# Patient Record
Sex: MALE | Race: WHITE | NOT HISPANIC OR LATINO | Employment: UNEMPLOYED | URBAN - METROPOLITAN AREA
[De-identification: names, ages, dates, MRNs, and addresses within clinical notes are randomized per-mention and may not be internally consistent; named-entity substitution may affect disease eponyms.]

---

## 2023-01-20 ENCOUNTER — HOSPITAL ENCOUNTER (INPATIENT)
Facility: HOSPITAL | Age: 38
LOS: 5 days | Discharge: HOME | DRG: 885 | End: 2023-01-25
Attending: PSYCHIATRY & NEUROLOGY | Admitting: PSYCHIATRY & NEUROLOGY
Payer: COMMERCIAL

## 2023-01-20 ENCOUNTER — HOSPITAL ENCOUNTER (EMERGENCY)
Facility: HOSPITAL | Age: 38
Discharge: PSYCHIATRIC OR SUBSTANCE USE FACILITY | DRG: 885 | End: 2023-01-20
Admitting: EMERGENCY MEDICINE
Payer: COMMERCIAL

## 2023-01-20 VITALS
BODY MASS INDEX: 30.04 KG/M2 | DIASTOLIC BLOOD PRESSURE: 83 MMHG | SYSTOLIC BLOOD PRESSURE: 149 MMHG | RESPIRATION RATE: 16 BRPM | HEART RATE: 65 BPM | OXYGEN SATURATION: 100 % | HEIGHT: 73 IN | TEMPERATURE: 98.2 F | WEIGHT: 226.63 LBS

## 2023-01-20 DIAGNOSIS — R44.2 TACTILE HALLUCINATIONS: ICD-10-CM

## 2023-01-20 DIAGNOSIS — F29 PSYCHOSIS, UNSPECIFIED PSYCHOSIS TYPE: Primary | ICD-10-CM

## 2023-01-20 DIAGNOSIS — R44.0 AUDITORY HALLUCINATIONS: ICD-10-CM

## 2023-01-20 DIAGNOSIS — R44.1 VISUAL HALLUCINATIONS: ICD-10-CM

## 2023-01-20 LAB
ALBUMIN SERPL-MCNC: 4.6 G/DL (ref 3.5–5.2)
ALBUMIN/GLOB SERPL: 1.6 G/DL
ALP SERPL-CCNC: 79 U/L (ref 39–117)
ALT SERPL W P-5'-P-CCNC: 23 U/L (ref 1–41)
AMPHET+METHAMPHET UR QL: NEGATIVE
ANION GAP SERPL CALCULATED.3IONS-SCNC: 7 MMOL/L (ref 5–15)
APAP SERPL-MCNC: <5 MCG/ML (ref 0–30)
AST SERPL-CCNC: 17 U/L (ref 1–40)
BARBITURATES UR QL SCN: NEGATIVE
BASOPHILS # BLD AUTO: 0.06 10*3/MM3 (ref 0–0.2)
BASOPHILS NFR BLD AUTO: 0.9 % (ref 0–1.5)
BENZODIAZ UR QL SCN: NEGATIVE
BILIRUB SERPL-MCNC: 0.2 MG/DL (ref 0–1.2)
BUN SERPL-MCNC: 10 MG/DL (ref 6–20)
BUN/CREAT SERPL: 13 (ref 7–25)
CALCIUM SPEC-SCNC: 9.9 MG/DL (ref 8.6–10.5)
CANNABINOIDS SERPL QL: NEGATIVE
CHLORIDE SERPL-SCNC: 101 MMOL/L (ref 98–107)
CO2 SERPL-SCNC: 31 MMOL/L (ref 22–29)
COCAINE UR QL: NEGATIVE
CREAT SERPL-MCNC: 0.77 MG/DL (ref 0.76–1.27)
DEPRECATED RDW RBC AUTO: 42 FL (ref 37–54)
EGFRCR SERPLBLD CKD-EPI 2021: 119 ML/MIN/1.73
EOSINOPHIL # BLD AUTO: 0.17 10*3/MM3 (ref 0–0.4)
EOSINOPHIL NFR BLD AUTO: 2.5 % (ref 0.3–6.2)
ERYTHROCYTE [DISTWIDTH] IN BLOOD BY AUTOMATED COUNT: 12.8 % (ref 12.3–15.4)
ETHANOL BLD-MCNC: <10 MG/DL (ref 0–10)
ETHANOL UR QL: <0.01 %
GLOBULIN UR ELPH-MCNC: 2.9 GM/DL
GLUCOSE SERPL-MCNC: 124 MG/DL (ref 65–99)
HCT VFR BLD AUTO: 43.8 % (ref 37.5–51)
HGB BLD-MCNC: 14.6 G/DL (ref 13–17.7)
HOLD SPECIMEN: NORMAL
HOLD SPECIMEN: NORMAL
IMM GRANULOCYTES # BLD AUTO: 0.01 10*3/MM3 (ref 0–0.05)
IMM GRANULOCYTES NFR BLD AUTO: 0.1 % (ref 0–0.5)
LYMPHOCYTES # BLD AUTO: 3.27 10*3/MM3 (ref 0.7–3.1)
LYMPHOCYTES NFR BLD AUTO: 48.7 % (ref 19.6–45.3)
MCH RBC QN AUTO: 29.9 PG (ref 26.6–33)
MCHC RBC AUTO-ENTMCNC: 33.3 G/DL (ref 31.5–35.7)
MCV RBC AUTO: 89.6 FL (ref 79–97)
METHADONE UR QL SCN: NEGATIVE
MONOCYTES # BLD AUTO: 0.49 10*3/MM3 (ref 0.1–0.9)
MONOCYTES NFR BLD AUTO: 7.3 % (ref 5–12)
NEUTROPHILS NFR BLD AUTO: 2.71 10*3/MM3 (ref 1.7–7)
NEUTROPHILS NFR BLD AUTO: 40.5 % (ref 42.7–76)
NRBC BLD AUTO-RTO: 0 /100 WBC (ref 0–0.2)
OPIATES UR QL: NEGATIVE
OXYCODONE UR QL SCN: NEGATIVE
PLATELET # BLD AUTO: 236 10*3/MM3 (ref 140–450)
PMV BLD AUTO: 8.9 FL (ref 6–12)
POTASSIUM SERPL-SCNC: 4 MMOL/L (ref 3.5–5.2)
PROT SERPL-MCNC: 7.5 G/DL (ref 6–8.5)
RBC # BLD AUTO: 4.89 10*6/MM3 (ref 4.14–5.8)
SALICYLATES SERPL-MCNC: <0.3 MG/DL
SODIUM SERPL-SCNC: 139 MMOL/L (ref 136–145)
WBC NRBC COR # BLD: 6.71 10*3/MM3 (ref 3.4–10.8)
WHOLE BLOOD HOLD COAG: NORMAL
WHOLE BLOOD HOLD SPECIMEN: NORMAL

## 2023-01-20 PROCEDURE — 80307 DRUG TEST PRSMV CHEM ANLYZR: CPT

## 2023-01-20 PROCEDURE — 99284 EMERGENCY DEPT VISIT MOD MDM: CPT

## 2023-01-20 PROCEDURE — 80143 DRUG ASSAY ACETAMINOPHEN: CPT

## 2023-01-20 PROCEDURE — 99283 EMERGENCY DEPT VISIT LOW MDM: CPT

## 2023-01-20 PROCEDURE — 85025 COMPLETE CBC W/AUTO DIFF WBC: CPT

## 2023-01-20 PROCEDURE — 80053 COMPREHEN METABOLIC PANEL: CPT

## 2023-01-20 PROCEDURE — 80179 DRUG ASSAY SALICYLATE: CPT

## 2023-01-20 PROCEDURE — 82077 ASSAY SPEC XCP UR&BREATH IA: CPT

## 2023-01-20 PROCEDURE — 36415 COLL VENOUS BLD VENIPUNCTURE: CPT

## 2023-01-20 RX ORDER — HALOPERIDOL 5 MG/ML
5 INJECTION INTRAMUSCULAR EVERY 4 HOURS PRN
Status: DISCONTINUED | OUTPATIENT
Start: 2023-01-20 | End: 2023-01-25 | Stop reason: HOSPADM

## 2023-01-20 RX ORDER — ALUMINA, MAGNESIA, AND SIMETHICONE 2400; 2400; 240 MG/30ML; MG/30ML; MG/30ML
15 SUSPENSION ORAL EVERY 6 HOURS PRN
Status: DISCONTINUED | OUTPATIENT
Start: 2023-01-20 | End: 2023-01-25 | Stop reason: HOSPADM

## 2023-01-20 RX ORDER — IBUPROFEN 400 MG/1
400 TABLET ORAL EVERY 6 HOURS PRN
Status: DISCONTINUED | OUTPATIENT
Start: 2023-01-20 | End: 2023-01-25 | Stop reason: HOSPADM

## 2023-01-20 RX ORDER — SODIUM CHLORIDE 0.9 % (FLUSH) 0.9 %
10 SYRINGE (ML) INJECTION AS NEEDED
Status: DISCONTINUED | OUTPATIENT
Start: 2023-01-20 | End: 2023-01-20 | Stop reason: HOSPADM

## 2023-01-20 RX ORDER — TRAZODONE HYDROCHLORIDE 50 MG/1
50 TABLET ORAL NIGHTLY PRN
Status: DISCONTINUED | OUTPATIENT
Start: 2023-01-20 | End: 2023-01-25 | Stop reason: HOSPADM

## 2023-01-20 RX ORDER — LOPERAMIDE HYDROCHLORIDE 2 MG/1
2 CAPSULE ORAL
Status: DISCONTINUED | OUTPATIENT
Start: 2023-01-20 | End: 2023-01-25 | Stop reason: HOSPADM

## 2023-01-20 RX ORDER — DIPHENHYDRAMINE HYDROCHLORIDE 50 MG/ML
50 INJECTION INTRAMUSCULAR; INTRAVENOUS EVERY 4 HOURS PRN
Status: DISCONTINUED | OUTPATIENT
Start: 2023-01-20 | End: 2023-01-25 | Stop reason: HOSPADM

## 2023-01-20 RX ORDER — BUPRENORPHINE HYDROCHLORIDE AND NALOXONE HYDROCHLORIDE DIHYDRATE 8; 2 MG/1; MG/1
1.5 TABLET SUBLINGUAL DAILY
Status: ON HOLD | COMMUNITY
End: 2023-01-25

## 2023-01-20 RX ORDER — FAMOTIDINE 20 MG/1
20 TABLET, FILM COATED ORAL 2 TIMES DAILY PRN
Status: DISCONTINUED | OUTPATIENT
Start: 2023-01-20 | End: 2023-01-25 | Stop reason: HOSPADM

## 2023-01-20 RX ORDER — HYDROXYZINE HYDROCHLORIDE 25 MG/1
50 TABLET, FILM COATED ORAL EVERY 6 HOURS PRN
Status: DISCONTINUED | OUTPATIENT
Start: 2023-01-20 | End: 2023-01-25 | Stop reason: HOSPADM

## 2023-01-20 RX ORDER — ACETAMINOPHEN 325 MG/1
650 TABLET ORAL EVERY 6 HOURS PRN
Status: DISCONTINUED | OUTPATIENT
Start: 2023-01-20 | End: 2023-01-25 | Stop reason: HOSPADM

## 2023-01-20 RX ORDER — DIPHENHYDRAMINE HCL 50 MG
50 CAPSULE ORAL EVERY 4 HOURS PRN
Status: DISCONTINUED | OUTPATIENT
Start: 2023-01-20 | End: 2023-01-25 | Stop reason: HOSPADM

## 2023-01-20 RX ORDER — HALOPERIDOL 5 MG/1
5 TABLET ORAL EVERY 4 HOURS PRN
Status: DISCONTINUED | OUTPATIENT
Start: 2023-01-20 | End: 2023-01-25 | Stop reason: HOSPADM

## 2023-01-21 PROCEDURE — 63710000001 DIPHENHYDRAMINE PER 50 MG: Performed by: PSYCHIATRY & NEUROLOGY

## 2023-01-21 RX ORDER — BUPRENORPHINE HYDROCHLORIDE AND NALOXONE HYDROCHLORIDE DIHYDRATE 8; 2 MG/1; MG/1
1.5 TABLET SUBLINGUAL DAILY
Status: DISCONTINUED | OUTPATIENT
Start: 2023-01-21 | End: 2023-01-25 | Stop reason: HOSPADM

## 2023-01-21 RX ORDER — NICOTINE 21 MG/24HR
1 PATCH, TRANSDERMAL 24 HOURS TRANSDERMAL
Status: DISCONTINUED | OUTPATIENT
Start: 2023-01-21 | End: 2023-01-25 | Stop reason: HOSPADM

## 2023-01-21 RX ORDER — RISPERIDONE 1 MG/1
1 TABLET ORAL EVERY 12 HOURS SCHEDULED
Status: DISCONTINUED | OUTPATIENT
Start: 2023-01-21 | End: 2023-01-22

## 2023-01-21 RX ADMIN — HYDROXYZINE HYDROCHLORIDE 50 MG: 25 TABLET, FILM COATED ORAL at 03:55

## 2023-01-21 RX ADMIN — BUPRENORPHINE HYDROCHLORIDE AND NALOXONE HYDROCHLORIDE DIHYDRATE 1.5 TABLET: 8; 2 TABLET SUBLINGUAL at 08:54

## 2023-01-21 RX ADMIN — RISPERIDONE 1 MG: 1 TABLET ORAL at 08:54

## 2023-01-21 RX ADMIN — HALOPERIDOL 5 MG: 5 TABLET ORAL at 23:06

## 2023-01-21 RX ADMIN — NICOTINE 1 PATCH: 21 PATCH, EXTENDED RELEASE TRANSDERMAL at 12:03

## 2023-01-21 RX ADMIN — RISPERIDONE 1 MG: 1 TABLET ORAL at 20:04

## 2023-01-21 RX ADMIN — DIPHENHYDRAMINE HYDROCHLORIDE 50 MG: 50 CAPSULE ORAL at 23:06

## 2023-01-21 RX ADMIN — IBUPROFEN 400 MG: 400 TABLET, FILM COATED ORAL at 03:55

## 2023-01-21 NOTE — ED PROVIDER NOTES
Time: 8:22 PM EST  Date of encounter:  1/20/2023  Independent Historian/Clinical History and Information was obtained by:   Patient and Family  Chief Complaint   Patient presents with   • Hallucinations     Patient reports visual, sensory, and auditory hallucinations X2 years. 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 Last meth use 12 days ago.       History is limited by: N/A    History of Present Illness:  Patient is a 37 y.o. year old male who presents to the emergency department for evaluation of hearing, seeing, and feeling demons and angels that are threatening to kill him.  Previous psych admission.  Denies SI/HI.  Was on meds, but stopped them because it only lessoned his symptoms but did not relieve them completely.  He reports that the demons and angels make him read the Bible.      HPI    Patient Care Team  Primary Care Provider: Provider, No Known    Past Medical History:     No Known Allergies  Past Medical History:   Diagnosis Date   • Depression      Past Surgical History:   Procedure Laterality Date   • CHOLECYSTECTOMY     • HERNIA REPAIR       History reviewed. No pertinent family history.    Home Medications:  Prior to Admission medications    Not on File        Social History:   Social History     Tobacco Use   • Smoking status: Every Day     Types: Cigarettes   Substance Use Topics   • Alcohol use: Never   • Drug use: Yes     Types: Methamphetamines, Marijuana     Comment: states he quit 12 weeks ago but had meth 12 days ago         Review of Systems:  Review of Systems   Psychiatric/Behavioral: Positive for agitation, decreased concentration, dysphoric mood, hallucinations and sleep disturbance. Negative for self-injury and suicidal ideas. The patient is nervous/anxious.    All other systems reviewed and are negative.       Physical Exam:  /83 (BP Location: Right arm, Patient Position: Lying)   Pulse 65   Temp 98.2 °F (36.8 °C)  "(Oral)   Resp 16   Ht 185.4 cm (73\")   Wt 103 kg (226 lb 10.1 oz)   SpO2 100%   BMI 29.90 kg/m²     Physical Exam  Vitals and nursing note reviewed.   Constitutional:       General: He is not in acute distress.     Appearance: Normal appearance. He is not ill-appearing, toxic-appearing or diaphoretic.   HENT:      Head: Normocephalic and atraumatic.   Eyes:      Extraocular Movements: Extraocular movements intact.      Conjunctiva/sclera: Conjunctivae normal.      Pupils: Pupils are equal, round, and reactive to light.   Cardiovascular:      Rate and Rhythm: Normal rate and regular rhythm.      Pulses: Normal pulses.      Heart sounds: Normal heart sounds.   Pulmonary:      Effort: Pulmonary effort is normal.      Breath sounds: Normal breath sounds.   Abdominal:      General: Abdomen is flat. Bowel sounds are normal. There is no distension.      Palpations: Abdomen is soft.   Musculoskeletal:         General: Normal range of motion.      Cervical back: Normal range of motion and neck supple.   Skin:     General: Skin is warm and dry.      Capillary Refill: Capillary refill takes less than 2 seconds.      Coloration: Skin is not cyanotic.   Neurological:      General: No focal deficit present.      Mental Status: He is alert and oriented to person, place, and time.      Cranial Nerves: No cranial nerve deficit.      Sensory: No sensory deficit.      Motor: No weakness.      Coordination: Coordination normal.      Gait: Gait normal.   Psychiatric:         Attention and Perception: Attention and perception normal.         Mood and Affect: Mood normal.                  Procedures:  Procedures      Medical Decision Making:      Comorbidities that affect care:    Smoking    External Notes reviewed:    Previous ED Note      The following orders were placed and all results were independently analyzed by me:  Orders Placed This Encounter   Procedures   • [place] Draw   • Comprehensive Metabolic Panel   • Acetaminophen " Level   • Ethanol   • Salicylate Level   • Urine Drug Screen - Urine, Clean Catch   • CBC Auto Differential   • NPO Diet NPO Type: Strict NPO   • Vital Signs   • Undress & Gown   • Psych / Access to See   • Inpatient Psychiatrist Consult   • Insert Peripheral IV   • Suicide Precautions   • CBC & Differential   • Green Top (Gel)   • Lavender Top   • Gold Top - SST   • Light Blue Top       Medications Given in the Emergency Department:  Medications   sodium chloride 0.9 % flush 10 mL (has no administration in time range)        ED Course:    The patient was initially evaluated in the triage area where orders were placed. The patient was later dispositioned by NAVIN Helton.      The patient was advised to stay for completion of workup which includes but is not limited to communication of labs and radiological results, reassessment and plan. The patient was advised that leaving prior to disposition by a provider could result in critical findings that are not communicated to the patient.          Labs:    Lab Results (last 24 hours)     Procedure Component Value Units Date/Time    CBC & Differential [[id number]]  (Abnormal) Collected: 01/20/23 1959    Specimen: Blood Updated: 01/20/23 2011    Narrative:      The following orders were created for panel order CBC & Differential.  Procedure                               Abnormality         Status                     ---------                               -----------         ------                     CBC Auto Differential[[id number]]        Abnormal            Final result                 Please view results for these tests on the individual orders.    Comprehensive Metabolic Panel [[id number]]  (Abnormal) Collected: 01/20/23 1959    Specimen: Blood Updated: 01/20/23 2041     Glucose 124 mg/dL      BUN 10 mg/dL      Creatinine 0.77 mg/dL      Sodium 139 mmol/L      Potassium 4.0 mmol/L      Chloride 101 mmol/L      CO2 31.0 mmol/L      Calcium 9.9 mg/dL      Total  Protein 7.5 g/dL      Albumin 4.6 g/dL      ALT (SGPT) 23 U/L      AST (SGOT) 17 U/L      Alkaline Phosphatase 79 U/L      Total Bilirubin 0.2 mg/dL      Globulin 2.9 gm/dL      A/G Ratio 1.6 g/dL      BUN/Creatinine Ratio 13.0     Anion Gap 7.0 mmol/L      eGFR 119.0 mL/min/1.73     Narrative:      GFR Normal >60  Chronic Kidney Disease <60  Kidney Failure <15      Acetaminophen Level [[id number]]  (Normal) Collected: 01/20/23 1959    Specimen: Blood Updated: 01/20/23 2041     Acetaminophen <5.0 mcg/mL     Ethanol [[id number]] Collected: 01/20/23 1959    Specimen: Blood Updated: 01/20/23 2041     Ethanol <10 mg/dL      Ethanol % <0.010 %     Narrative:      Ethanol (Plasma)  <10 Essentially Negative    Toxic Concentrations           mg/dL    Flushing, slowing of reflexes    Impaired visual activity         Depression of CNS              >100  Possible Coma                  >300       Salicylate Level [[id number]]  (Normal) Collected: 01/20/23 1959    Specimen: Blood Updated: 01/20/23 2041     Salicylate <0.3 mg/dL     CBC Auto Differential [[id number]]  (Abnormal) Collected: 01/20/23 1959    Specimen: Blood Updated: 01/20/23 2011     WBC 6.71 10*3/mm3      RBC 4.89 10*6/mm3      Hemoglobin 14.6 g/dL      Hematocrit 43.8 %      MCV 89.6 fL      MCH 29.9 pg      MCHC 33.3 g/dL      RDW 12.8 %      RDW-SD 42.0 fl      MPV 8.9 fL      Platelets 236 10*3/mm3      Neutrophil % 40.5 %      Lymphocyte % 48.7 %      Monocyte % 7.3 %      Eosinophil % 2.5 %      Basophil % 0.9 %      Immature Grans % 0.1 %      Neutrophils, Absolute 2.71 10*3/mm3      Lymphocytes, Absolute 3.27 10*3/mm3      Monocytes, Absolute 0.49 10*3/mm3      Eosinophils, Absolute 0.17 10*3/mm3      Basophils, Absolute 0.06 10*3/mm3      Immature Grans, Absolute 0.01 10*3/mm3      nRBC 0.0 /100 WBC     Urine Drug Screen - Urine, Clean Catch [[id number]]  (Normal) Collected: 01/20/23 2001    Specimen: Urine, Clean Catch Updated: 01/20/23 2025      Amphet/Methamphet, Screen Negative     Barbiturates Screen, Urine Negative     Benzodiazepine Screen, Urine Negative     Cocaine Screen, Urine Negative     Opiate Screen Negative     THC, Screen, Urine Negative     Methadone Screen, Urine Negative     Oxycodone Screen, Urine Negative    Narrative:      Negative Thresholds Per Drugs Screened:    Amphetamines                 500 ng/ml  Barbiturates                 200 ng/ml  Benzodiazepines              100 ng/ml  Cocaine                      300 ng/ml  Methadone                    300 ng/ml  Opiates                      300 ng/ml  Oxycodone                    100 ng/ml  THC                           50 ng/ml    The Normal Value for all drugs tested is negative. This report includes final unconfirmed screening results to be used for medical treatment purposes only. Unconfirmed results must not be used for non-medical purposes such as employment or legal testing. Clinical consideration should be applied to any drug of abuse test, particularly when unconfirmed results are used.                   Imaging:    No Radiology Exams Resulted Within Past 24 Hours      Differential Diagnosis and Discussion:      Psychiatric: Differential diagnosis includes but is not limited to depression, psychosis, bipolar disorder, anxiety, manic episode, schizophrenia, and substance abuse.    All labs were reviewed and analyzed by me.    MDM  Number of Diagnoses or Management Options  Auditory hallucinations  Psychosis, unspecified psychosis type (HCC)  Tactile hallucinations  Visual hallucinations  Diagnosis management comments: Seen and assessed patient as noted.  Vitals stable, no acute distress, afebrile.       Patient has psychosis with auditory, visual, and tactile hallucinations and is willing to be admitted for help.  Calling psychiatrist now for admission for psychosis and hallucinations.    Dr Alexis kindly accepted the admission.       Amount and/or Complexity of Data  Reviewed  Clinical lab tests: ordered and reviewed  Discuss the patient with other providers: yes    Risk of Complications, Morbidity, and/or Mortality  Presenting problems: high  Diagnostic procedures: low  Management options: moderate    Patient Progress  Patient progress: stable           Patient Care Considerations:          Consultants/Shared Management Plan:    Consultant: I have discussed the case with Dr Alexis who agrees to consult on the patient.    Social Determinants of Health:    Patient is independent, reliable, and has access to care.       Disposition and Care Coordination:    Admit:   Through independent evaluation of the patient's history, physical, and imperical data, the patient meets criteria for observation/admission to the hospital.        Final diagnoses:   Psychosis, unspecified psychosis type (HCC)   Auditory hallucinations   Visual hallucinations   Tactile hallucinations        ED Disposition     ED Disposition   DC/Transfer to Behavioral Health Condition   Stable    Comment   --             This medical record created using voice recognition software.           Ema Liriano, NAVIN  01/20/23 2032       Ema Liriano, APRBRADEN  01/20/23 2034       Ema Liriano, APRN  01/20/23 2118

## 2023-01-22 RX ORDER — ARIPIPRAZOLE 10 MG/1
10 TABLET ORAL DAILY
Status: DISCONTINUED | OUTPATIENT
Start: 2023-01-22 | End: 2023-01-25 | Stop reason: DRUGHIGH

## 2023-01-22 RX ADMIN — ARIPIPRAZOLE 10 MG: 10 TABLET ORAL at 11:13

## 2023-01-22 RX ADMIN — NICOTINE 1 PATCH: 21 PATCH, EXTENDED RELEASE TRANSDERMAL at 11:13

## 2023-01-22 RX ADMIN — TRAZODONE HYDROCHLORIDE 50 MG: 50 TABLET ORAL at 20:31

## 2023-01-22 RX ADMIN — BUPRENORPHINE HYDROCHLORIDE AND NALOXONE HYDROCHLORIDE DIHYDRATE 1.5 TABLET: 8; 2 TABLET SUBLINGUAL at 11:14

## 2023-01-23 RX ORDER — PRAZOSIN HYDROCHLORIDE 1 MG/1
1 CAPSULE ORAL NIGHTLY
Status: DISCONTINUED | OUTPATIENT
Start: 2023-01-23 | End: 2023-01-25 | Stop reason: HOSPADM

## 2023-01-23 RX ADMIN — HYDROXYZINE HYDROCHLORIDE 50 MG: 25 TABLET, FILM COATED ORAL at 21:49

## 2023-01-23 RX ADMIN — NICOTINE 1 PATCH: 21 PATCH, EXTENDED RELEASE TRANSDERMAL at 08:51

## 2023-01-23 RX ADMIN — TRAZODONE HYDROCHLORIDE 50 MG: 50 TABLET ORAL at 21:31

## 2023-01-23 RX ADMIN — BUPRENORPHINE HYDROCHLORIDE AND NALOXONE HYDROCHLORIDE DIHYDRATE 1.5 TABLET: 8; 2 TABLET SUBLINGUAL at 08:51

## 2023-01-23 RX ADMIN — PRAZOSIN HYDROCHLORIDE 1 MG: 1 CAPSULE ORAL at 21:32

## 2023-01-23 RX ADMIN — ALUMINUM HYDROXIDE, MAGNESIUM HYDROXIDE, AND DIMETHICONE 15 ML: 400; 400; 40 SUSPENSION ORAL at 23:26

## 2023-01-23 RX ADMIN — ARIPIPRAZOLE 10 MG: 10 TABLET ORAL at 08:51

## 2023-01-24 RX ORDER — ARIPIPRAZOLE 5 MG/1
5 TABLET ORAL EVERY MORNING
Status: DISCONTINUED | OUTPATIENT
Start: 2023-01-24 | End: 2023-01-25 | Stop reason: DRUGHIGH

## 2023-01-24 RX ADMIN — ARIPIPRAZOLE 10 MG: 10 TABLET ORAL at 08:38

## 2023-01-24 RX ADMIN — ARIPIPRAZOLE 5 MG: 5 TABLET ORAL at 11:50

## 2023-01-24 RX ADMIN — PRAZOSIN HYDROCHLORIDE 1 MG: 1 CAPSULE ORAL at 21:32

## 2023-01-24 RX ADMIN — ALUMINUM HYDROXIDE, MAGNESIUM HYDROXIDE, AND DIMETHICONE 15 ML: 400; 400; 40 SUSPENSION ORAL at 16:35

## 2023-01-24 RX ADMIN — BUPRENORPHINE HYDROCHLORIDE AND NALOXONE HYDROCHLORIDE DIHYDRATE 1.5 TABLET: 8; 2 TABLET SUBLINGUAL at 08:38

## 2023-01-24 RX ADMIN — NICOTINE 1 PATCH: 21 PATCH, EXTENDED RELEASE TRANSDERMAL at 08:38

## 2023-01-25 VITALS
TEMPERATURE: 97.9 F | OXYGEN SATURATION: 99 % | BODY MASS INDEX: 30.09 KG/M2 | HEART RATE: 98 BPM | WEIGHT: 227.07 LBS | DIASTOLIC BLOOD PRESSURE: 92 MMHG | SYSTOLIC BLOOD PRESSURE: 147 MMHG | RESPIRATION RATE: 18 BRPM | HEIGHT: 73 IN

## 2023-01-25 RX ORDER — ARIPIPRAZOLE 15 MG/1
15 TABLET ORAL DAILY
Status: DISCONTINUED | OUTPATIENT
Start: 2023-01-25 | End: 2023-01-25 | Stop reason: HOSPADM

## 2023-01-25 RX ORDER — PRAZOSIN HYDROCHLORIDE 1 MG/1
1 CAPSULE ORAL NIGHTLY
Qty: 30 CAPSULE | Refills: 0 | Status: SHIPPED | OUTPATIENT
Start: 2023-01-25

## 2023-01-25 RX ORDER — ARIPIPRAZOLE 15 MG/1
15 TABLET ORAL DAILY
Qty: 30 TABLET | Refills: 0 | Status: SHIPPED | OUTPATIENT
Start: 2023-01-26

## 2023-01-25 RX ADMIN — BUPRENORPHINE HYDROCHLORIDE AND NALOXONE HYDROCHLORIDE DIHYDRATE 1.5 TABLET: 8; 2 TABLET SUBLINGUAL at 09:04

## 2023-01-25 RX ADMIN — ARIPIPRAZOLE 15 MG: 15 TABLET ORAL at 09:04

## 2023-01-25 RX ADMIN — ACETAMINOPHEN 650 MG: 325 TABLET ORAL at 15:39

## 2023-01-25 RX ADMIN — NICOTINE 1 PATCH: 21 PATCH, EXTENDED RELEASE TRANSDERMAL at 09:03

## 2025-04-10 ENCOUNTER — HOSPITAL ENCOUNTER (INPATIENT)
Facility: HOSPITAL | Age: 40
LOS: 4 days | Discharge: HOME | DRG: 885 | End: 2025-04-14
Attending: PSYCHIATRY & NEUROLOGY | Admitting: PSYCHIATRY & NEUROLOGY
Payer: MEDICAID

## 2025-04-10 PROBLEM — F29 PSYCHOSIS, UNSPECIFIED PSYCHOSIS TYPE: Status: ACTIVE | Noted: 2025-04-10

## 2025-04-10 LAB
T4 FREE SERPL-MCNC: 1.48 NG/DL (ref 0.92–1.68)
TSH SERPL DL<=0.05 MIU/L-ACNC: 2.28 UIU/ML (ref 0.27–4.2)
WHOLE BLOOD HOLD SPECIMEN: NORMAL

## 2025-04-10 PROCEDURE — 84443 ASSAY THYROID STIM HORMONE: CPT | Performed by: PSYCHIATRY & NEUROLOGY

## 2025-04-10 PROCEDURE — 84439 ASSAY OF FREE THYROXINE: CPT | Performed by: PSYCHIATRY & NEUROLOGY

## 2025-04-10 RX ORDER — TRAZODONE HYDROCHLORIDE 100 MG/1
100 TABLET ORAL NIGHTLY PRN
Status: DISCONTINUED | OUTPATIENT
Start: 2025-04-10 | End: 2025-04-14 | Stop reason: HOSPADM

## 2025-04-10 RX ORDER — DIAZEPAM 10 MG/2ML
5 INJECTION, SOLUTION INTRAMUSCULAR; INTRAVENOUS EVERY 4 HOURS PRN
Status: DISCONTINUED | OUTPATIENT
Start: 2025-04-10 | End: 2025-04-14 | Stop reason: HOSPADM

## 2025-04-10 RX ORDER — HALOPERIDOL 5 MG/ML
5 INJECTION INTRAMUSCULAR EVERY 4 HOURS PRN
Status: DISCONTINUED | OUTPATIENT
Start: 2025-04-10 | End: 2025-04-14 | Stop reason: HOSPADM

## 2025-04-10 RX ORDER — ALUMINA, MAGNESIA, AND SIMETHICONE 2400; 2400; 240 MG/30ML; MG/30ML; MG/30ML
15 SUSPENSION ORAL EVERY 6 HOURS PRN
Status: DISCONTINUED | OUTPATIENT
Start: 2025-04-10 | End: 2025-04-14 | Stop reason: HOSPADM

## 2025-04-10 RX ORDER — ACETAMINOPHEN 325 MG/1
650 TABLET ORAL EVERY 6 HOURS PRN
Status: DISCONTINUED | OUTPATIENT
Start: 2025-04-10 | End: 2025-04-14 | Stop reason: HOSPADM

## 2025-04-10 RX ORDER — LISINOPRIL 10 MG/1
10 TABLET ORAL DAILY
Status: ON HOLD | COMMUNITY
End: 2025-04-14

## 2025-04-10 RX ORDER — HALOPERIDOL 5 MG/1
5 TABLET ORAL EVERY 4 HOURS PRN
Status: DISCONTINUED | OUTPATIENT
Start: 2025-04-10 | End: 2025-04-14 | Stop reason: HOSPADM

## 2025-04-10 RX ORDER — LOPERAMIDE HYDROCHLORIDE 2 MG/1
2 CAPSULE ORAL
Status: DISCONTINUED | OUTPATIENT
Start: 2025-04-10 | End: 2025-04-14 | Stop reason: HOSPADM

## 2025-04-10 RX ORDER — DIPHENHYDRAMINE HCL 50 MG
50 CAPSULE ORAL EVERY 4 HOURS PRN
Status: DISCONTINUED | OUTPATIENT
Start: 2025-04-10 | End: 2025-04-14 | Stop reason: HOSPADM

## 2025-04-10 RX ORDER — NICOTINE 21 MG/24HR
1 PATCH, TRANSDERMAL 24 HOURS TRANSDERMAL DAILY PRN
Status: DISCONTINUED | OUTPATIENT
Start: 2025-04-10 | End: 2025-04-14 | Stop reason: HOSPADM

## 2025-04-10 RX ORDER — BUPRENORPHINE HYDROCHLORIDE AND NALOXONE HYDROCHLORIDE DIHYDRATE 8; 2 MG/1; MG/1
2 TABLET SUBLINGUAL DAILY
COMMUNITY

## 2025-04-10 RX ORDER — DIPHENHYDRAMINE HYDROCHLORIDE 50 MG/ML
50 INJECTION, SOLUTION INTRAMUSCULAR; INTRAVENOUS EVERY 4 HOURS PRN
Status: DISCONTINUED | OUTPATIENT
Start: 2025-04-10 | End: 2025-04-14 | Stop reason: HOSPADM

## 2025-04-10 RX ORDER — LORAZEPAM 2 MG/1
2 TABLET ORAL EVERY 4 HOURS PRN
Status: DISCONTINUED | OUTPATIENT
Start: 2025-04-10 | End: 2025-04-14 | Stop reason: HOSPADM

## 2025-04-10 RX ORDER — HYDROXYZINE HYDROCHLORIDE 25 MG/1
50 TABLET, FILM COATED ORAL EVERY 6 HOURS PRN
Status: DISCONTINUED | OUTPATIENT
Start: 2025-04-10 | End: 2025-04-14 | Stop reason: HOSPADM

## 2025-04-11 PROBLEM — Z72.0 TOBACCO ABUSE: Status: ACTIVE | Noted: 2024-08-12

## 2025-04-11 PROBLEM — F15.10 AMPHETAMINE ABUSE: Status: ACTIVE | Noted: 2025-04-11

## 2025-04-11 PROBLEM — F15.10 METHAMPHETAMINE ABUSE: Status: ACTIVE | Noted: 2024-08-12

## 2025-04-11 PROBLEM — I10 HYPERTENSION: Status: ACTIVE | Noted: 2024-08-12

## 2025-04-11 RX ORDER — PANTOPRAZOLE SODIUM 40 MG/1
40 TABLET, DELAYED RELEASE ORAL
Status: DISCONTINUED | OUTPATIENT
Start: 2025-04-12 | End: 2025-04-14 | Stop reason: HOSPADM

## 2025-04-11 RX ORDER — LISINOPRIL 10 MG/1
10 TABLET ORAL DAILY
Status: DISCONTINUED | OUTPATIENT
Start: 2025-04-11 | End: 2025-04-14 | Stop reason: HOSPADM

## 2025-04-11 RX ORDER — RISPERIDONE 3 MG/1
3 TABLET ORAL NIGHTLY
Status: DISCONTINUED | OUTPATIENT
Start: 2025-04-11 | End: 2025-04-14 | Stop reason: HOSPADM

## 2025-04-11 RX ORDER — OMEPRAZOLE 20 MG/1
1 CAPSULE, DELAYED RELEASE ORAL DAILY
Status: ON HOLD | COMMUNITY
Start: 2025-02-18 | End: 2025-04-14

## 2025-04-11 RX ORDER — BUPRENORPHINE HYDROCHLORIDE AND NALOXONE HYDROCHLORIDE DIHYDRATE 8; 2 MG/1; MG/1
2 TABLET SUBLINGUAL DAILY
Status: DISCONTINUED | OUTPATIENT
Start: 2025-04-11 | End: 2025-04-14 | Stop reason: HOSPADM

## 2025-04-11 RX ORDER — BUPRENORPHINE AND NALOXONE 8; 2 MG/1; MG/1
2 FILM, SOLUBLE BUCCAL; SUBLINGUAL DAILY
Status: ON HOLD | COMMUNITY
End: 2025-04-11 | Stop reason: SDUPTHER

## 2025-04-11 RX ADMIN — LISINOPRIL 10 MG: 10 TABLET ORAL at 12:27

## 2025-04-11 RX ADMIN — RISPERIDONE 3 MG: 3 TABLET, FILM COATED ORAL at 21:42

## 2025-04-11 RX ADMIN — BUPRENORPHINE AND NALOXONE 2 TABLET: 8; 2 TABLET SUBLINGUAL at 12:29

## 2025-04-11 RX ADMIN — TRAZODONE HYDROCHLORIDE 100 MG: 100 TABLET ORAL at 21:42

## 2025-04-11 NOTE — NURSING NOTE
Pt is alert and oriented and able to make needs known.  Pt has been resting in bed since completing assessment and having a snack.  NO s/s of acute distress observed at this time.  POC remains ongoing.

## 2025-04-11 NOTE — NURSING NOTE
"Pt arrived to [facility] at approx 2136 as an [facility] involuntary admission to the the services of Dr Jack for dx psychosis unspecified.  Pt was escorted to unit by  x 2 and security x 5.  Pt was calm and cooperative with search, orientation to unit and initial assessment.  Pt reports that he woke up today and walked 15 miles from [facility] where he lives to the hospital in [place].  Pt explains that he went to get help for hearing voices that have been bothering him for 11 years.  Pt describes the voices as threatening to kill him and sound like men and women.  Pt reports that he has been prescribed about 10 different medications in the past and nothing helps to eliminate the voices.  Pt reports past psych admissions to [facility] and to a hospital in [place]. Pt reports that he takes Lisinopril, Suboxone and something for acid reflux. Pt denies SI or HI.  Pt denies any past hx of SI or suicide attempts.  Pt reports using meth before going to [place].  Pt states that meth is the only substance that he currently uses.  Pt denies use of alcohol.  Pt reports smoking 1ppd of cigarettes and vaping nicotine.  Pt denies hx of violence.  Pt reports that he is unemployed and lives in [place] with his mother. Pt reports that he is not  and has 2 teenage children with whom he doesn't have contact.  Pt reports that he has been to rehab in the past and denies current interest in substance abuse treatment.  Pt reports anxiety and depression as \"not bad\" a this time. Pt reports that he has not slept in 2 days. Pt was given a snack and drink and is currently resting in bed with no s/s of acute distress observed at this time.      Per medical records from [facility] pt was given 50mg PO Atarax and 5mg PO Haldol and 1549 today.   "

## 2025-04-11 NOTE — PLAN OF CARE
"Goal Outcome Evaluation:  Plan of Care Reviewed With: patient  Patient Agreement with Plan of Care: agrees                                  Pt has been withdrawn to bed for most of the day. He is guarded. He denies SI/HI and contracts for safety. He reports hearing voices telling him, \"get that pig.\" He rates anxiety 2, depression 3. He is able to make his needs known. Will continue to monitor and provide safe environment.     "

## 2025-04-11 NOTE — SIGNIFICANT NOTE
04/11/25 1435   Group Therapy Session   Group Attendance attended group session   Time Session Began 1400   Time Session Ended 1425   Total Time (minutes) 25   Group Type recreation   Group Topic Covered community integration;leisure exploration/use of leisure time;structured socialization;cognitive activities;self care activities   Group Session Detail 15-minute Guided Self-Love Meditation + Open discussion   Patient Participation/Contribution closed eyes for most of session;arrived late   Patient Participation Detail Pt fell asleep shortly after group meditation was started by RT   Degree of Insight none     RT initiated guided meditation for self-love. Pts were asked to sit and make themselves comfortable in either chair or recliner. Lights turned off for a relaxing environment while pts are still visible for observation checks.    Pt was appropriate and confirmed with RT he'd come to group after being invited and educated on groups being part of his treatment. Pt walked in to dayroom after meditation had started but sat down quietly in closest recliner and engaged in meditation. However, shortly after this pt was seen with mouth open fast asleep.     Pt did wake after meditation ended and RT opened the room for a short discussion about the meditation session. Pt seemed to have difficulty staying awake - possible due to medication and recent substance use prior to admission.     Overall pt was quiet and appropriate with peers and staff. Compliant with instruction from RT when awake. No expression/indication of SI/HI or aggression at this time.

## 2025-04-11 NOTE — H&P
"[facility]   PSYCHIATRIC  HISTORY AND PHYSICAL    Patient Name: Hermelindo Santoyo  : 1985  MRN: [id number]  Primary Care Physician:  Provider, No Known  Date of admission: 4/10/2025    Subjective   Subjective     Legal Status: MIW    Chief Complaint: \"Hearing voices\"    HPI:     Hermelindo Santoyo is a 40 y.o. adult with a history of psychosis, depression, and substance use admitted on MIW.  Patient was initially referred from [facility] for admission and was accepted for psychosis in the face of methamphetamine misuse.  Patient initially told [facility] was willing to transfer and be admitted voluntarily.  However, he changed his mind refusing admission and MIW was initiated by physicians in [facility].  MIW was upheld and the patient has been admitted to [facility].    The patient is somewhat uncooperative and difficult to engage today but does participate in interview.  There is no acute restlessness or agitation.  States that he is hearing voices.  States that they did not tell him they are going to hurt him.  States that he hears both the voices of men and women.  He reports he called 911 yesterday because the voices had an increase in severity.    He reports being \"a little bit\" depressed.  Reports depression will make him have bad thoughts.      He has not been on any medications for some time and cannot quantify for how long other than, \"a little while.\"  Reports multiple trials and never been effective.    He acknowledges using methamphetamine from time to time and discussed his positive tox screen and he does not want a referral to drug and alcohol rehabilitation    Per review of [facility] he has been taking Suboxone on a regular basis since before the first of the year.      Review of Systems:      CONSTITUTIONAL: Complains  PSYCHIATRIC: As documented in HPI    Personal History     Past Medical History:   Diagnosis Date    Depression     Psychiatric illness     Psychosis     " Substance abuse        Past Surgical History:   Procedure Laterality Date    CHOLECYSTECTOMY      HERNIA REPAIR         Past Psychiatric History: History of depression and substance use    Psychiatric Hospitalizations: Second hospitalization here    Prior Treatment and Medications Tried: Multiple medication trials, he does not recall the names of medicines      Family History: family history is not on file. Otherwise pertinent FHx was reviewed and not pertinent to current issue.    Family Psych History:None known to patient      Family Substance Abuse History:None known to patient      Family Suicide History:None known to patient      Social History:     Unemployed.  Has been living with his mother.    Social History     Socioeconomic History    Marital status: Single   Tobacco Use    Smoking status: Every Day     Current packs/day: 1.00     Average packs/day: 1.4 packs/day for 25.3 years (35.3 ttl pk-yrs)     Types: Cigarettes     Start date: 2010    Smokeless tobacco: Never    Tobacco comments:     Printed information.   Vaping Use    Vaping status: Some Days    Substances: Nicotine    Devices: Disposable   Substance and Sexual Activity    Alcohol use: Never    Drug use: Yes     Types: Methamphetamines, Marijuana     Comment: states he quit 12 weeks ago but had meth 12 days ago    Sexual activity: Not Currently       Substance Abuse History: reports that he has been smoking cigarettes. He started smoking about 15 years ago. He has a 35.3 pack-year smoking history. He has never used smokeless tobacco. He reports current drug use. Drugs: Methamphetamines and Marijuana. He reports that he does not drink alcohol.    Home Medications:   buprenorphine-naloxone, lisinopril, and omeprazole      Allergies:  No Known Allergies    Objective   Objective     Vitals:   Temp:  [97.2 °F (36.2 °C)-98.1 °F (36.7 °C)] 97.2 °F (36.2 °C)  Heart Rate:  [60-68] 60  Resp:  [16-18] 16  BP: (125-149)/(76-94) 125/76    Physical Exam:       First exam the following are noted by observation     CONSTITUTIONAL: Patient is well developed, well nourished, awake and alert.  HEENT: Head and neck are normocephalic and atraumatic.   LUNGS: Even unlabored respirations.  SKIN: Clean, dry, intact.  EXTREMITIES: No clubbing, cyanosis, edema.  MUSCULOSKELETAL: Symmetric body habitus. Spine straight. Strength intact,  NEUROLOGIC: Appropriate. No abnormal movements, good muscle tone.                              Cerebellar: station and gait steady.  Cranial Nerves: Grossly intact        Mental Status Exam:     Patient lying in bed sleeping.  He does arouse for the interview.  He remains in bed and keeps eyes closed for most of the interview.  He provides minimal responses.  There is no acute agitation.  Appears quite disinterested in evaluation       Hygiene:   fair  Cooperation:   Minimally  Eye Contact:  Poor  Psychomotor Behavior:  Appropriate  Affect:  Blunted  Mood: Dysthymic  Speech:  Normal and Minimal  Language: Appropriate  Thought Process:  Goal directed and Linear  Thought Content:  Normal  Suicidal:  None  Homicidal:  None  Hallucinations:  Auditory and he reports auditory hallucinations but there is no evidence of responding to internal stimuli and suspect hallucinations and bizarre thinking are due to methamphetamine intoxication  Delusion:  Paranoid  Memory:  Intact  Orientation:  Person, Place, Time, and Situation  Reliability:  fair  Insight:  Poor  Judgement:  Impaired  Impulse Control:  Impaired        Result Review    Result Review:  I have personally reviewed the results from the time of this admission to 4/11/2025 14:09 EDT and agree with these findings:  [x]  Laboratory  []  Microbiology  []  Radiology  []  EKG/Telemetry   []  Cardiology/Vascular   []  Pathology  []  Old records  []  Other:  Most notable findings include: Toxicology positive for amphetamines    Assessment & Plan   Assessment / Plan     Brief Patient Summary:  Hermelindo  Natanael is a 40 y.o. male who admitted for psychosis methamphetamine misuse    Active Hospital Problems:  Active Hospital Problems    Diagnosis     Methamphetamine abuse     Tobacco abuse     Psychosis        Plan:   Add Risperdal for psychosis  Continue to encourage drug and alcohol rehabilitation  Monitor mood and affect need to move forward the MIW process but fully anticipate patient discharging early next week  Admit for safety and stabilization and placed on appropriate precautions.  Begin treatment for underlying mood disorder or psychosis with appropriate medications.  Treatment team to assess and implement appropriate treatment plan for the patient's care.  Attempt to gain collateral information of possible  Work on safety plan  Provide supportive therapy  Patient to engage in all group and individual treatment modalities available including milieu therapy  Work on appropriate disposition follow-up  Estimated length of stay in hospital 4 to 5 days      VTE Prophylaxis:  Mechanical VTE prophylaxis orders are present.        CODE STATUS:    Code Status (Patient has no pulse and is not breathing): CPR (Attempt to Resuscitate)  Medical Interventions (Patient has pulse or is breathing): Full Support      Admission Status:  I believe this patient meets inpatient status.      Part of this note may be an electronic transcription/translation of spoken language to printed text using the Dragon dictation system.      Electronically signed by Wellington Jack MD, 04/11/25 14:09 EDT

## 2025-04-11 NOTE — PLAN OF CARE
Goal Outcome Evaluation:  Plan of Care Reviewed With: patient  Patient Agreement with Plan of Care: agrees     Progress: no change    Patient newly arrived to the unit from [facility] via [facility] and medical clearance from [facility] ED.  Physical and behavioral nursing assessments completed.  Plan of care with interventions selected and explained to patient with questions encouraged.   Patient encouraged to provide feedback and make needs known for entire time of stay.  Emotional support and safe environment are ongoing.

## 2025-04-12 RX ADMIN — HYDROXYZINE HYDROCHLORIDE 50 MG: 25 TABLET, FILM COATED ORAL at 20:22

## 2025-04-12 RX ADMIN — TRAZODONE HYDROCHLORIDE 100 MG: 100 TABLET ORAL at 20:22

## 2025-04-12 RX ADMIN — RISPERIDONE 3 MG: 3 TABLET, FILM COATED ORAL at 20:22

## 2025-04-12 RX ADMIN — LISINOPRIL 10 MG: 10 TABLET ORAL at 10:22

## 2025-04-12 RX ADMIN — LISINOPRIL 10 MG: 10 TABLET ORAL at 10:21

## 2025-04-12 RX ADMIN — PANTOPRAZOLE SODIUM 40 MG: 40 TABLET, DELAYED RELEASE ORAL at 05:57

## 2025-04-12 RX ADMIN — BUPRENORPHINE AND NALOXONE 2 TABLET: 8; 2 TABLET SUBLINGUAL at 10:22

## 2025-04-12 NOTE — PLAN OF CARE
"Goal Outcome Evaluation:  Plan of Care Reviewed With: patient  Patient Agreement with Plan of Care: agrees                                  Pt has been withdrawn to room sleeping for much of the day, but has been out in dayroom on a limited basis. He denies SI/HI and contracts for safety. He reports AH saying, \"get me that pig.\" He rates anxiety 1, depression 2. He is able to make his needs known, He has not attended group today. Will continue to monitor and provide safe environment.    "

## 2025-04-12 NOTE — PROGRESS NOTES
Hermelindo Santoyo  40 y.o.  268/2  04/12/25  [place] Jack MD    Subjective     Patient is seen and evaluated by me latest clinical data reviewed discussed with the staff.  Patient stated he has been doing well he was quite calm and cooperative in his evaluation although he just woke up.  He is here on amantadine Crestor and he has been using meth he walked from [place] to [facility] which is about 30 miles to seek help.  He is fairly withdrawn and to his bed he is on Suboxone which has been prescribed to him.  Patient stated that he feels better    Objective     .  Staff reported that patient is better and his symptoms of psychosis.  He is still mostly withdrawn to his bed.  He is compliant with the medication sleeping well no problems.    Vitals:    04/12/25 0848   BP: 120/87   Pulse: 78   Resp: 16   Temp: 97.9 °F (36.6 °C)   SpO2: 99%       Result Review       Current Facility-Administered Medications:     acetaminophen (TYLENOL) tablet 650 mg, 650 mg, Oral, Q6H PRN, Joy Mayorga MD    aluminum-magnesium hydroxide-simethicone (MAALOX MAX) 400-400-40 MG/5ML suspension 15 mL, 15 mL, Oral, Q6H PRN, Joy Mayorga MD    buprenorphine-naloxone (SUBOXONE) 8-2 MG per SL tablet 2 tablet, 2 tablet, Sublingual, Daily, [place] Jack MD, 2 tablet at 04/12/25 1022    haloperidol lactate (HALDOL) injection 5 mg, 5 mg, Intramuscular, Q4H PRN **AND** diphenhydrAMINE (BENADRYL) injection 50 mg, 50 mg, Intramuscular, Q4H PRN **AND** diazePAM (VALIUM) injection 5 mg, 5 mg, Intravenous, Q4H PRN, Joy Mayorga MD    LORazepam (ATIVAN) tablet 2 mg, 2 mg, Oral, Q4H PRN **AND** haloperidol (HALDOL) tablet 5 mg, 5 mg, Oral, Q4H PRN **AND** diphenhydrAMINE (BENADRYL) capsule 50 mg, 50 mg, Oral, Q4H PRN, Joy Mayorga MD    hydrOXYzine (ATARAX) tablet 50 mg, 50 mg, Oral, Q6H PRN, Joy Mayorga MD    lisinopril (PRINIVIL,ZESTRIL) tablet 10 mg, 10 mg, Oral, Daily, [place] Jack MD, 10 mg at 04/12/25 1022    loperamide  (IMODIUM) capsule 2 mg, 2 mg, Oral, Q2H PRN, Joy Mayorga MD    magnesium hydroxide (MILK OF MAGNESIA) suspension 10 mL, 10 mL, Oral, Daily PRN, Joy Mayorga MD    nicotine (NICODERM CQ) 21 MG/24HR patch 1 patch, 1 patch, Transdermal, Daily PRN, Joy Mayorga MD    pantoprazole (PROTONIX) EC tablet 40 mg, 40 mg, Oral, Q AM, [place] Jack MD, 40 mg at 04/12/25 0557    risperiDONE (risperDAL) tablet 3 mg, 3 mg, Oral, Nightly, [place] Jack MD, 3 mg at 04/11/25 2142    traZODone (DESYREL) tablet 100 mg, 100 mg, Oral, Nightly PRN, Joy Mayorga MD, 100 mg at 04/11/25 2142    Mental Status exam:    Appearance: Somewhat somnolent unkempt  Concentration/Focus: Improved  Behaviors: Cooperative  Cognitive function: Alert and oriented x 3  Memory : Improved  Speech: Clear minimal in content coherent  Language: Normal  Mood : Improved  Affect: Euthymic  Thought process: Linear goal-directed  Thought Content: Denies paranoia auditory or visual hallucination patient denies being suicidal or homicidal  Insight: Improved  Judgement: Improved continue inpatient hospitalization till      Assessment       Psychosis    Hypertension    Methamphetamine abuse    Tobacco abuse       Plan:     Complete clearing of the symptoms.  Continue on current medication regimen since patient is progressively improving.  Encouraged him to attend groups and other activities in the milieu to get the maximum benefit of this hospitalization  Encouraged for maintaining sobriety and may need to have rehab after discharge from the hospital to maintain his sobriety.  Collateral information to be obtained from the family or friends to know the details of his hospitalization and use of meth.  Supportive psychotherapy is provided to the patient.      Electronically signed by Joy Mayorga MD, 04/12/25, 10:57 AM EDT.

## 2025-04-12 NOTE — PAYOR COMM NOTE
"Jo-Ann Vang (40 y.o. Male)       Date of Birth   1985    Social Security Number       Address   [address] [id number]    Home Phone   [phone]    MRN   [id number]       Jain   Religion    Marital Status   Single                            Admission Date   4/10/2025    Admission Type   Urgent    Admitting Provider   Wellington Jack MD    Attending Provider   Wellington Jack MD    Department, Room/Bed   [facility], 268/2       Discharge Date       Discharge Disposition       Discharge Destination                                 Attending Provider: Wellington Jack MD    Allergies: No Known Allergies    Isolation: None   Infection: None   Code Status: CPR    Ht: 185.4 cm (73\")   Wt: 101 kg (222 lb 12.8 oz)    Admission Cmt: None   Principal Problem: None                  Active Insurance as of 4/10/2025       Primary Coverage       Payor Plan Insurance Group Employer/Plan Group    [place]        Payor Plan Address Payor Plan Phone Number Payor Plan Fax Number Effective Dates    [address] [phone]  4/10/2025 - None Entered    [facility] [id number]         Subscriber Name Subscriber Birth Date Member ID       JO-ANN VANG 1985 [id number]                     Emergency Contacts        (Rel.) Home Phone Work Phone Mobile Phone    ZIA VANG (Mother) -- -- [phone]             Other Psychotic Disorders, Adult: Inpatient Care Clinical Indications for Admission to Inpatient Care       Indications Met   Last updated by Gladys Tolliver RN on 4/11/2025 1026     Review Status Created By   Primary Completed Gladys Tolliver RN      Criteria Review   Other Psychotic Disorders, Adult: Inpatient Care Clinical Indications for Admission to Inpatient Care     Overall Determination: Indications Met     Criteria:  [×] Admission to Inpatient Level of Care for Adult  (LOCUS Level 6 - Medically Managed Residential Services, " Composite Score 28 or more) is indicated due to  ALL  of the following  [A] [B] [C] [D]:      [×] Patient risk or severity of behavioral health disorder is appropriate to proposed level of care, as indicated by  1 or more  of the following  (1) (3) (4) (10) (11) (12):          [×] Imminent danger to self for adult is present due to  1 or more  of the following  (1) (4) (13):              [×] Patient has persistent Thoughts of suicide or serious Harm to self that cannot be adequately monitored or treated at lower level of care, as indicated by  1 or more  of the following  [E]:                  [×] Necessary behavioral health care (such as required provider or lower level of care) not available or insufficient                      4/11/2025 10:26 AM                          -- 4/11/2025 10:26 AM by Gladys Tolliver RN --                              hearing voices that have been bothering him for 11 years.  Pt describes the voices as threatening to kill him and sound like men and women.      [×] Treatment services available at proposed level of care are necessary to meet patient needs and  1 or more  of the following  [N]:          [×] Specific condition related to admission diagnosis is present and judged likely to further improve at proposed level of care.          [×] Specific condition related to admission diagnosis is present and judged likely to deteriorate in absence of treatment at proposed level of care.      [×] Situation and expectations are appropriate for inpatient care for adult, as indicated by  1 or more  of the following  (1) (4) (12) (39) (40):          [×] Patient is unwilling to participate in treatment voluntarily and requires treatment (eg, legal commitment) in an involuntary unit.              4/11/2025 10:26 AM                  -- 4/11/2025 10:26 AM by Gladys Tolliver RN --                      miw     Notes:  -- 4/11/2025 10:26 AM by Gladys Tolliver RN --      Subject: Admission      MIW  "involuntary admission to the the services of Dr Jack for dx psychosis unspecified. Pt was escorted to unit by  x 2 and security x 5. Pt was calm and cooperative with search, orientation to unit and initial assessment. Pt reports that he woke up today and walked 15 miles from [facility] where he lives to the hospital in [place]. Pt explains that he went to get help for hearing voices that have been bothering him for 11 years. Pt describes the voices as threatening to kill him and sound like men and women. Pt reports that he has been prescribed about 10 different medications in the past and nothing helps to eliminate the voices. Pt reports past psych admissions to [facility] and to a hospital in [place]. Pt reports that he takes Lisinopril, Suboxone and something for acid reflux. Pt denies SI or HI. Pt denies any past hx of SI or suicide attempts. Pt reports using meth before going to [place]. Pt states that meth is the only substance that he currently uses. Pt denies use of alcohol. Pt reports smoking 1ppd of cigarettes and vaping nicotine. Pt denies hx of violence. Pt reports that he is unemployed and lives in [place] with his mother. Pt reports that he is not  and has 2 teenage children with whom he doesn't have contact. Pt reports that he has been to rehab in the past and denies current interest in substance abuse treatment. Pt reports anxiety and depression as \"not bad\" a this time. Pt reports that he has not slept in 2 days. Pt was given a snack and drink and is currently resting in bed with no s/s of acute distress observed at this time.             Per medical records from [facility] pt was given 50mg PO Atarax and 5mg PO Haldol and 1549 today.             Other Psychotic Disorders, Adult: Inpatient Care Care Day 1       Guideline Day Complete   Last updated by Gladys Tolliver, RN on 4/11/2025 1026     Review Status Created By   Primary Completed Gladys Tolliver, RN    " "  Criteria Review   Other Psychotic Disorders, Adult: Inpatient Care Care Day 1     Overall Determination: Guideline Day Complete     Progression:  Day of Care: 1  Progression Day: 1  Review Date: 4/10/2025  Expected Length of Stay: 3 days     Criteria:  [×] Clinical Indications met             Libia Dhaliwal, RN   Registered Nurse  Nursing     Nursing Note      Addendum     Date of Service: 04/10/25 2156  Creation Time: 04/10/25 2156       Pt arrived to [facility] at approx 2136 as an MIW involuntary admission to the the services of Dr Jack for dx psychosis unspecified.  Pt was escorted to unit by  x 2 and security x 5.  Pt was calm and cooperative with search, orientation to unit and initial assessment.  Pt reports that he woke up today and walked 15 miles from [facility] where he lives to the hospital in [place].  Pt explains that he went to get help for hearing voices that have been bothering him for 11 years.  Pt describes the voices as threatening to kill him and sound like men and women.  Pt reports that he has been prescribed about 10 different medications in the past and nothing helps to eliminate the voices.  Pt reports past psych admissions to [facility] and to a hospital in [place]. Pt reports that he takes Lisinopril, Suboxone and something for acid reflux. Pt denies SI or HI.  Pt denies any past hx of SI or suicide attempts.  Pt reports using meth before going to [place].  Pt states that meth is the only substance that he currently uses.  Pt denies use of alcohol.  Pt reports smoking 1ppd of cigarettes and vaping nicotine.  Pt denies hx of violence.  Pt reports that he is unemployed and lives in [place] with his mother. Pt reports that he is not  and has 2 teenage children with whom he doesn't have contact.  Pt reports that he has been to rehab in the past and denies current interest in substance abuse treatment.  Pt reports anxiety and depression as \"not bad\" a this " "time. Pt reports that he has not slept in 2 days. Pt was given a snack and drink and is currently resting in bed with no s/s of acute distress observed at this time.       Per medical records from [facility] pt was given 50mg PO Atarax and 5mg PO Haldol and 1549 today.         Revision History        Alesha Valadez, RN   Registered Nurse  Nursing     Plan of Care      Signed     Date of Service: 04/10/25 2232  Creation Time: 04/10/25 2232     Signed         Goal Outcome Evaluation:  Plan of Care Reviewed With: patient  Patient Agreement with Plan of Care: agrees  Progress: no change     Patient newly arrived to the unit from [facility] [place] via [facility] and medical clearance from [facility] ED.  Physical and behavioral nursing assessments completed.  Plan of care with interventions selected and explained to patient with questions encouraged.   Patient encouraged to provide feedback and make needs known for entire time of stay.  Emotional support and safe environment are ongoing.                             Wellington Jack MD   Physician  Psychiatry     H&P      Signed     Date of Service: 25  Creation Time: 25     Signed       Expand All [facility] All     [facility]   PSYCHIATRIC  HISTORY AND PHYSICAL     Patient Name: Hermelindo Santoyo  : 1985  MRN: [id number]  Primary Care Physician:  Provider, No Known  Date of admission: 4/10/2025     Subjective[]Expand by Default  Subjective      Legal Status: [facility]     Chief Complaint: \"Hearing voices\"     HPI:      Hermelindo Santoyo is a 40 y.o. adult with a history of psychosis, depression, and substance use admitted on [facility].  Patient was initially referred from [facility] for admission and was accepted for psychosis in the face of methamphetamine misuse.  Patient initially told [facility] was willing to transfer and be admitted voluntarily.  However, he changed his mind refusing admission and MIW was " "initiated by physicians in outlying facility.  MIW was upheld and the patient has been admitted to [facility].     The patient is somewhat uncooperative and difficult to engage today but does participate in interview.  There is no acute restlessness or agitation.  States that he is hearing voices.  States that they did not tell him they are going to hurt him.  States that he hears both the voices of men and women.  He reports he called 911 yesterday because the voices had an increase in severity.     He reports being \"a little bit\" depressed.  Reports depression will make him have bad thoughts.       He has not been on any medications for some time and cannot quantify for how long other than, \"a little while.\"  Reports multiple trials and never been effective.     He acknowledges using methamphetamine from time to time and discussed his positive tox screen and he does not want a referral to drug and alcohol rehabilitation     Per review of Sam he has been taking Suboxone on a regular basis since before the first of the year.        Review of Systems:                 CONSTITUTIONAL: Complains  PSYCHIATRIC: As documented in HPI     Personal History      Medical History        Past Medical History:   Diagnosis Date    Depression      Psychiatric illness      Psychosis      Substance abuse              Surgical History         Past Surgical History:   Procedure Laterality Date    CHOLECYSTECTOMY        HERNIA REPAIR                Past Psychiatric History: History of depression and substance use     Psychiatric Hospitalizations: Second hospitalization here     Prior Treatment and Medications Tried: Multiple medication trials, he does not recall the names of medicines        Family History: family history is not on file. Otherwise pertinent FHx was reviewed and not pertinent to current issue.     Family Psych History:None known to patient        Family Substance Abuse History:None known to patient        Family " Suicide History:None known to patient        Social History:      Unemployed.  Has been living with his mother.     Social History   Social History            Socioeconomic History    Marital status: Single   Tobacco Use    Smoking status: Every Day       Current packs/day: 1.00       Average packs/day: 1.4 packs/day for 25.3 years (35.3 ttl pk-yrs)       Types: Cigarettes       Start date: 2010    Smokeless tobacco: Never    Tobacco comments:       Printed information.   Vaping Use    Vaping status: Some Days    Substances: Nicotine    Devices: Disposable   Substance and Sexual Activity    Alcohol use: Never    Drug use: Yes       Types: Methamphetamines, Marijuana       Comment: states he quit 12 weeks ago but had meth 12 days ago    Sexual activity: Not Currently            Substance Abuse History: reports that he has been smoking cigarettes. He started smoking about 15 years ago. He has a 35.3 pack-year smoking history. He has never used smokeless tobacco. He reports current drug use. Drugs: Methamphetamines and Marijuana. He reports that he does not drink alcohol.     Home Medications:   buprenorphine-naloxone, lisinopril, and omeprazole        Allergies:  Allergies   No Known Allergies        Objective  Objective      Vitals:   Temp:  [97.2 °F (36.2 °C)-98.1 °F (36.7 °C)] 97.2 °F (36.2 °C)  Heart Rate:  [60-68] 60  Resp:  [16-18] 16  BP: (125-149)/(76-94) 125/76     Physical Exam:                  First exam the following are noted by observation                CONSTITUTIONAL: Patient is well developed, well nourished, awake and alert.  HEENT: Head and neck are normocephalic and atraumatic.   LUNGS: Even unlabored respirations.  SKIN: Clean, dry, intact.  EXTREMITIES: No clubbing, cyanosis, edema.  MUSCULOSKELETAL: Symmetric body habitus. Spine straight. Strength intact,  NEUROLOGIC: Appropriate. No abnormal movements, good muscle tone.                              Cerebellar: station and gait steady.  Cranial  Nerves: Grossly intact           Mental Status Exam:      Patient lying in bed sleeping.  He does arouse for the interview.  He remains in bed and keeps eyes closed for most of the interview.  He provides minimal responses.  There is no acute agitation.  Appears quite disinterested in evaluation                   Hygiene:   fair  Cooperation:   Minimally  Eye Contact:  Poor  Psychomotor Behavior:  Appropriate  Affect:  Blunted  Mood: Dysthymic  Speech:  Normal and Minimal  Language: Appropriate  Thought Process:  Goal directed and Linear  Thought Content:  Normal  Suicidal:  None  Homicidal:  None  Hallucinations:  Auditory and he reports auditory hallucinations but there is no evidence of responding to internal stimuli and suspect hallucinations and bizarre thinking are due to methamphetamine intoxication  Delusion:  Paranoid  Memory:  Intact  Orientation:  Person, Place, Time, and Situation  Reliability:  fair  Insight:  Poor  Judgement:  Impaired  Impulse Control:  Impaired           Result Review  Result Review:  I have personally reviewed the results from the time of this admission to 4/11/2025 14:09 EDT and agree with these findings:  [x]  Laboratory  []  Microbiology  []  Radiology  []  EKG/Telemetry   []  Cardiology/Vascular   []  Pathology  []  Old records  []  Other:  Most notable findings include: Toxicology positive for amphetamines     Assessment & Plan  Assessment / Plan      Brief Patient Summary:  Hermelindo Santoyo is a 40 y.o. male who admitted for psychosis methamphetamine misuse     Active Hospital Problems:       Active Hospital Problems     Diagnosis      Methamphetamine abuse      Tobacco abuse      Psychosis           Plan:   Add Risperdal for psychosis  Continue to encourage drug and alcohol rehabilitation  Monitor mood and affect need to move forward the MIW process but fully anticipate patient discharging early next week  Admit for safety and stabilization and placed on appropriate  precautions.  Begin treatment for underlying mood disorder or psychosis with appropriate medications.  Treatment team to assess and implement appropriate treatment plan for the patient's care.  Attempt to gain collateral information of possible  Work on safety plan  Provide supportive therapy  Patient to engage in all group and individual treatment modalities available including milieu therapy  Work on appropriate disposition follow-up  Estimated length of stay in hospital 4 to 5 days        VTE Prophylaxis:  Mechanical VTE prophylaxis orders are present.           CODE STATUS:    Code Status (Patient has no pulse and is not breathing): CPR (Attempt to Resuscitate)  Medical Interventions (Patient has pulse or is breathing): Full Support        Admission Status:  I believe this patient meets inpatient status.        Part of this note may be an electronic transcription/translation of spoken language to printed text using the Dragon dictation system.       Electronically signed by Wellington Jack MD, 04/11/25 14:09 Joy Bryan MD   Physician  Psychiatry     Progress Notes      Signed     Date of Service: 04/12/25 1056  Creation Time: 04/12/25 1056     Signed       Expand All Collapse All    Hermelindo Santoyo  40 y.o.  268/2  04/12/25  Wellington Jack MD     Subjective[]Expand by Default  Patient is seen and evaluated by me latest clinical data reviewed discussed with the staff.  Patient stated he has been doing well he was quite calm and cooperative in his evaluation although he just woke up.  He is here on amantadine Crestor and he has been using meth he walked from [place] to [facility] which is about 30 miles to seek help.  He is fairly withdrawn and to his bed he is on Suboxone which has been prescribed to him.  Patient stated that he feels better     Objective  .  Staff reported that patient is better and his symptoms of psychosis.  He is still mostly withdrawn to his bed.  He is  compliant with the medication sleeping well no problems.         Vitals:     04/12/25 0848   BP: 120/87   Pulse: 78   Resp: 16   Temp: 97.9 °F (36.6 °C)   SpO2: 99%         Result Review    Current Medications      Current Facility-Administered Medications:     acetaminophen (TYLENOL) tablet 650 mg, 650 mg, Oral, Q6H PRN, Joy Mayorga MD    aluminum-magnesium hydroxide-simethicone (MAALOX MAX) 400-400-40 MG/5ML suspension 15 mL, 15 mL, Oral, Q6H PRN, Joy Mayorga MD    buprenorphine-naloxone (SUBOXONE) 8-2 MG per SL tablet 2 tablet, 2 tablet, Sublingual, Daily, Wellington Jack MD, 2 tablet at 04/12/25 1022    haloperidol lactate (HALDOL) injection 5 mg, 5 mg, Intramuscular, Q4H PRN **AND** diphenhydrAMINE (BENADRYL) injection 50 mg, 50 mg, Intramuscular, Q4H PRN **AND** diazePAM (VALIUM) injection 5 mg, 5 mg, Intravenous, Q4H PRN, Joy Mayorga MD    LORazepam (ATIVAN) tablet 2 mg, 2 mg, Oral, Q4H PRN **AND** haloperidol (HALDOL) tablet 5 mg, 5 mg, Oral, Q4H PRN **AND** diphenhydrAMINE (BENADRYL) capsule 50 mg, 50 mg, Oral, Q4H PRN, Joy Mayorga MD    hydrOXYzine (ATARAX) tablet 50 mg, 50 mg, Oral, Q6H PRN, Joy Mayorga MD    lisinopril (PRINIVIL,ZESTRIL) tablet 10 mg, 10 mg, Oral, Daily, Wellington Jack MD, 10 mg at 04/12/25 1022    loperamide (IMODIUM) capsule 2 mg, 2 mg, Oral, Q2H PRN, Joy Mayorga MD    magnesium hydroxide (MILK OF MAGNESIA) suspension 10 mL, 10 mL, Oral, Daily PRN, Joy Mayorga MD    nicotine (NICODERM CQ) 21 MG/24HR patch 1 patch, 1 patch, Transdermal, Daily PRN, Joy Mayorga MD    pantoprazole (PROTONIX) EC tablet 40 mg, 40 mg, Oral, Q AM, Wellington Jack MD, 40 mg at 04/12/25 0557    risperiDONE (risperDAL) tablet 3 mg, 3 mg, Oral, Nightly, Wellington Jack MD, 3 mg at 04/11/25 2142    traZODone (DESYREL) tablet 100 mg, 100 mg, Oral, Nightly PRN, Joy Mayorga MD, 100 mg at 04/11/25 2142        Mental Status exam:     Appearance: Somewhat somnolent  unkempt  Concentration/Focus: Improved  Behaviors: Cooperative  Cognitive function: Alert and oriented x 3  Memory : Improved  Speech: Clear minimal in content coherent  Language: Normal  Mood : Improved  Affect: Euthymic  Thought process: Linear goal-directed  Thought Content: Denies paranoia auditory or visual hallucination patient denies being suicidal or homicidal  Insight: Improved  Judgement: Improved continue inpatient hospitalization till        Assessment    Psychosis    Hypertension    Methamphetamine abuse    Tobacco abuse        Plan:      Complete clearing of the symptoms.  Continue on current medication regimen since patient is progressively improving.  Encouraged him to attend groups and other activities in the milieu to get the maximum benefit of this hospitalization  Encouraged for maintaining sobriety and may need to have rehab after discharge from the hospital to maintain his sobriety.  Collateral information to be obtained from the family or friends to know the details of his hospitalization and use of meth.  Supportive psychotherapy is provided to the patient.        Electronically signed by Joy Mayorga MD, 04/12/25, 10:57 AM EDT.            Lisa SAGASTUME.                              >       For KAREY SHE WILL BE BACK  [facility]                                         MONDAY   [phone]-                                      [phone]  F [phone]                                          >>THE SAME

## 2025-04-12 NOTE — PLAN OF CARE
"Goal Outcome Evaluation:  Plan of Care Reviewed With: patient  Patient Agreement with Plan of Care: agrees        Outcome Evaluation: PT is AAO, VSS, no c/o pain, SOA or N/V/D. Has been withdrawn to his room, sleeping between care. Has been calm, cooperative, compliant with medications, appetite adequate. No s/s of psychosis. Will engage in conversation but remains guarded, delusional thoughts.  Rates anxiety 2, depression 3, denies SI/HI/VH, endorses AH with \"mumbling voices\" most of the time but will hear voices call him a \"pig\", no commands reported. Appears to have slept well overnight, no acute events this shift. Continue to provide emotional support, rapport and a safe environment. Continue with current POC at this time.                             "

## 2025-04-13 RX ADMIN — LISINOPRIL 10 MG: 10 TABLET ORAL at 09:48

## 2025-04-13 RX ADMIN — BUPRENORPHINE AND NALOXONE 2 TABLET: 8; 2 TABLET SUBLINGUAL at 09:48

## 2025-04-13 RX ADMIN — RISPERIDONE 3 MG: 3 TABLET, FILM COATED ORAL at 21:20

## 2025-04-13 RX ADMIN — PANTOPRAZOLE SODIUM 40 MG: 40 TABLET, DELAYED RELEASE ORAL at 05:32

## 2025-04-13 RX ADMIN — TRAZODONE HYDROCHLORIDE 100 MG: 100 TABLET ORAL at 21:33

## 2025-04-13 NOTE — PLAN OF CARE
"Goal Outcome Evaluation:  Plan of Care Reviewed With: patient  Plan of Care Reviewed With: patient  Patient Agreement with Plan of Care: agrees        Outcome Evaluation: PT is AAO, VSS, no c/o pain, SOA or N/V/D. Has ventured from room more today, interacts with peers, still sleeps alot between care; calm, cooperative, compliant with medications, denies SI/HI/VH but endorses \"a little\" AV, hears \"mumbles\" but no commands, rates anxiety 1/10, depression 2/10.  Slept well overnight with no acute events, no s/s of psychosis or agitation this shift. Continue to provide emotional support, rapport and a safe environment, continue with current POC at this time.                             "

## 2025-04-13 NOTE — PLAN OF CARE
Goal Outcome Evaluation:  Plan of Care Reviewed With: patient  Patient Agreement with Plan of Care: agrees                                  Pt has been out in dayroom withdrawn to self. He remains difficult to engage with intermittent eye contact. He denies SI/HI and contract for safety. He denies all s/s. He is able to make his needs known. Will continue to monitor and provide safe environment.

## 2025-04-13 NOTE — PROGRESS NOTES
Hermelindo Santoyo  40 y.o.  268/2  04/13/25  Wellington Jack MD    Subjective     Patient is seen and evaluated by me latest clinical data reviewed discussed with the staff patient is more up and about and he was seen in the TV room where he was watching TV.  Patient appears more energetic and he stated that he feels far better today than yesterday.  He stated he slept very well his appetite is good.  Compliant with the medication and treatment he denies having visual hallucination and auditory hallucination have improved just a little bit buzzing.  He denies being suicidal or homicidal    Objective     Staff reported that patient is doing much better he is much less withdrawn comes out of his room more often.  He is compliant with the medication he rates his anxiety as 1 and depression as 2.    Vitals:    04/13/25 0820   BP: 122/70   Pulse: 58   Resp: 20   Temp: 98.4 °F (36.9 °C)   SpO2: 100%       Result Review       Current Facility-Administered Medications:     acetaminophen (TYLENOL) tablet 650 mg, 650 mg, Oral, Q6H PRN, Joy Mayorga MD    aluminum-magnesium hydroxide-simethicone (MAALOX MAX) 400-400-40 MG/5ML suspension 15 mL, 15 mL, Oral, Q6H PRN, Joy Mayorga MD    buprenorphine-naloxone (SUBOXONE) 8-2 MG per SL tablet 2 tablet, 2 tablet, Sublingual, Daily, Wellington Jack MD, 2 tablet at 04/13/25 0948    haloperidol lactate (HALDOL) injection 5 mg, 5 mg, Intramuscular, Q4H PRN **AND** diphenhydrAMINE (BENADRYL) injection 50 mg, 50 mg, Intramuscular, Q4H PRN **AND** diazePAM (VALIUM) injection 5 mg, 5 mg, Intravenous, Q4H PRN, Joy Mayorga MD    LORazepam (ATIVAN) tablet 2 mg, 2 mg, Oral, Q4H PRN **AND** haloperidol (HALDOL) tablet 5 mg, 5 mg, Oral, Q4H PRN **AND** diphenhydrAMINE (BENADRYL) capsule 50 mg, 50 mg, Oral, Q4H PRN, Joy Mayorga MD    hydrOXYzine (ATARAX) tablet 50 mg, 50 mg, Oral, Q6H PRN, Joy Mayorga MD, 50 mg at 04/12/25 2022    lisinopril (PRINIVIL,ZESTRIL) tablet 10 mg, 10 mg,  Oral, Daily, Wellington Jack MD, 10 mg at 04/13/25 0948    loperamide (IMODIUM) capsule 2 mg, 2 mg, Oral, Q2H PRN, Joy Mayorga MD    magnesium hydroxide (MILK OF MAGNESIA) suspension 10 mL, 10 mL, Oral, Daily PRN, Joy Mayorga MD    nicotine (NICODERM CQ) 21 MG/24HR patch 1 patch, 1 patch, Transdermal, Daily PRN, Joy Mayorga MD    pantoprazole (PROTONIX) EC tablet 40 mg, 40 mg, Oral, Q AM, Wellington Jack MD, 40 mg at 04/13/25 0532    risperiDONE (risperDAL) tablet 3 mg, 3 mg, Oral, Nightly, Wellington Jack MD, 3 mg at 04/12/25 2022    traZODone (DESYREL) tablet 100 mg, 100 mg, Oral, Nightly PRN, Joy Mayorga MD, 100 mg at 04/12/25 2022    Mental Status exam:    Appearance: Calm and cooperative more alert and oriented  Concentration/Focus: Improved  Behaviors: Appropriate  Cognitive function: Alert and oriented x 3  Memory : Intact in all 3 spheres  Speech: Clear and coherent  Language: Normal  Mood : Much better  Affect: Restricted  Thought process: Linear goal-directed  Thought Content: Denies paranoia auditory or visual hallucinations  Insight: Improved  Judgement: Improved      Assessment       Psychosis    Hypertension    Methamphetamine abuse    Tobacco abuse       Plan:     Continue inpatient hospitalization for further stabilization  Encouraged for continued  complete symptom resolution  Encourage to attend groups and other activities provided on the unit  Collateral information to be acquired for safe disposition  Encouraged for maintaining sobriety.  Supportive psychotherapy is provided.      Electronically signed by Joy Mayorga MD, 04/13/25, 10:49 AM EDT.

## 2025-04-14 VITALS
SYSTOLIC BLOOD PRESSURE: 137 MMHG | HEART RATE: 78 BPM | RESPIRATION RATE: 18 BRPM | HEIGHT: 73 IN | OXYGEN SATURATION: 100 % | BODY MASS INDEX: 29.53 KG/M2 | DIASTOLIC BLOOD PRESSURE: 94 MMHG | TEMPERATURE: 97.9 F | WEIGHT: 222.8 LBS

## 2025-04-14 RX ORDER — LISINOPRIL 10 MG/1
10 TABLET ORAL DAILY
Qty: 30 TABLET | Refills: 1 | Status: SHIPPED | OUTPATIENT
Start: 2025-04-14

## 2025-04-14 RX ORDER — RISPERIDONE 3 MG/1
3 TABLET ORAL NIGHTLY
Qty: 30 TABLET | Refills: 1 | Status: SHIPPED | OUTPATIENT
Start: 2025-04-14

## 2025-04-14 RX ORDER — TRAZODONE HYDROCHLORIDE 100 MG/1
100 TABLET ORAL NIGHTLY PRN
Qty: 30 TABLET | Refills: 1 | Status: SHIPPED | OUTPATIENT
Start: 2025-04-14

## 2025-04-14 RX ORDER — OMEPRAZOLE 20 MG/1
20 CAPSULE, DELAYED RELEASE ORAL DAILY
Qty: 30 CAPSULE | Refills: 1 | Status: SHIPPED | OUTPATIENT
Start: 2025-04-14

## 2025-04-14 RX ADMIN — BUPRENORPHINE AND NALOXONE 2 TABLET: 8; 2 TABLET SUBLINGUAL at 08:26

## 2025-04-14 RX ADMIN — PANTOPRAZOLE SODIUM 40 MG: 40 TABLET, DELAYED RELEASE ORAL at 06:16

## 2025-04-14 RX ADMIN — LISINOPRIL 10 MG: 10 TABLET ORAL at 08:26

## 2025-04-14 NOTE — PLAN OF CARE
Goal Outcome Evaluation:  Plan of Care Reviewed With: patient  Patient Agreement with Plan of Care: agrees  Consent Given to Review Plan with: Leeanne Santoyo- Mother     Pt is alert and oriented and able to make needs known.  Pt denies SI or HI.  Pt denies a/v/h.  Pt spent time in the day room and had snack.  Pt cooperated with night meds.  Pt rates anxiety 3/10 and depression 2/10.  NO s/s of acute distress observed at this time.  Care plan is ongoing.

## 2025-04-14 NOTE — SIGNIFICANT NOTE
04/14/25 1157   Group Therapy Session   Group Attendance attended group session   Time Session Began 1100   Time Session Ended 1120   Total Time (minutes) 20   Group Type psychotherapeutic;recreation   Group Topic Covered community integration;structured socialization;cognitive activities;coping skills/lifestyle management   Literature/Videos Given topic handouts   Literature/Videos Given Comments Behavioral Activation Wkt   Group Session Detail Radical Acceptance   Patient Participation/Contribution did not discuss personal experience;did not share thoughts verbally;cooperative with task;listened actively   Affect During Group appropriate to situation;calm   Degree of Insight none     Discussed radical acceptance and encouraged group to consider how our emotions can impact our behaviors, and to only control what we are able to. Provided group with behavioral activation worksheet to identify enjoyable activities pts can do when feeling depressed. Group also had to identify responsibilities that can be scheduled into our day to make various tasks easier to accomplish. In-depth discussion about treatment and educated group on responsibilities as pts to be compliant with treatment even after discharging from our facility. At end of group pts were able to indicate appropriate ways to handle situations we cannot control, and compiled their own list of activities and responsibilities to start their own behavioral activation schedule.     Pt sat quietly at table and did not discuss group topic or engage with peers. Actively listened throughout and completed behavioral activation worksheet.     RT discussed treatment compliance in relation to our responsibilities while filling out the behavioral activation worksheet. Educated group on the responsibility of maintaining treatment compliance even after discharging to prevent worsening symptoms and/or readmission. Pt was nodding his head in agreement during this  discussion.     Overall pt was quiet and maintained appropriate behaviors with peers and staff. Compliant with instruction from RT. No expression/indication of SI/HI or aggression at this time.

## 2025-04-14 NOTE — DISCHARGE SUMMARY
[facility]         DISCHARGE SUMMARY    Patient Name: Hermelindo Santoyo  : 1985  MRN: [id number]    Date of Admission: 4/10/2025  Date of Discharge: 2025  Primary Care Physician: Provider, No Known    Consults       No orders found from 3/12/2025 to 2025.            Presenting Problem:   Psychosis, unspecified psychosis type [F29]    Active and Resolved Hospital Problems:  Active Hospital Problems    Diagnosis POA    Methamphetamine abuse [F15.10] Yes    Tobacco abuse [Z72.0] Yes    Hypertension [I10] Yes    Psychosis [F29] Yes      Resolved Hospital Problems   No resolved problems to display.         Hospital Course     Hospital Course:    Hermelindo Santoyo is a 40 y.o. male admitted on for bizarre behavior with suicidal ideations and psychosis.  Patient was seen in [facility] hospital and refused voluntary admission and they initiated an MIW.  Patient was found to be positive for methamphetamine at the [facility] facility.    Patient was started on Risperdal and his mood and affect improved.  Hallucinations dissipated.  Reported the patient was having suicidal ideations but he denied these at time of admission.    Patient's been calm and cooperative throughout his stay.  He has not required any medicines for acute agitation.  He has required 1 dose of hydroxyzine for anxiety during his stay.  He did take trazodone to help with sleep.    Patient is calm and cooperative.  He is engaging.  He is up and out in the milieu.  He is engaged with staff and peers in appropriate fashion.  There is no obvious psychosis.  He is appropriate and is future oriented goal directed.  Patient requesting discharge does not meet criteria for continued involuntary hospitalization and will discharge home    Patient with a history of hypertension and was started back on his home antihypertensives.  Blood pressure has been stable and follow-up with primary care for continued monitoring    On day of discharge  "patient is calm, cooperative, engaging, and has no acute agitation or restlessness.  Speech is normal rate and volume and language is 4..  Mood is described as \"good\" and affect is euthymic.  Thought processes are directed, linear, and future oriented.  Thought content is negative for suicidal or homicidal ideation no auditory or visual hallucinations.  Insight is good, and judgment is appropriate.      DISCHARGE Follow Up Recommendations for labs and diagnostics: Routine health maintenance primary care, substance use treatment, [facility] mental [facility]      Day of Discharge     Vital Signs:  Temp:  [97.9 °F (36.6 °C)] 97.9 °F (36.6 °C)  Heart Rate:  [78] 78  Resp:  [18] 18  BP: (137)/(94) 137/94      Pertinent  and/or Most Recent Results     LAB RESULTS:          Lab 04/10/25  2221   TSH 2.280                                             Brief Urine Lab Results       None                                Imaging Results (Last 7 Days)       ** No results found for the last 168 hours. **             Labs Pending at Discharge:           Discharge Details        Discharge Medications        New Medications        Instructions Start Date   risperiDONE 3 MG tablet  Commonly known as: risperDAL   3 mg, Oral, Nightly      traZODone 100 MG tablet  Commonly known as: DESYREL   100 mg, Oral, Nightly PRN             Continue These Medications        Instructions Start Date   buprenorphine-naloxone 8-2 MG per SL tablet  Commonly known as: SUBOXONE   2 tablets, Sublingual, Daily      lisinopril 10 MG tablet  Commonly known as: PRINIVIL,ZESTRIL   10 mg, Oral, Daily      omeprazole 20 MG capsule  Commonly known as: priLOSEC   20 mg, Oral, Daily               No Known Allergies      Discharge Disposition:  Home or Self Care    Diet:  Hospital:  Diet Order   Procedures    Diet: Regular/House; Fluid Consistency: Thin (IDDSI 0)         Discharge Activity: Ad lexa.  Activity Instructions       Activity as Tolerated              Discharge " Condition: Stable    CODE STATUS:  Code Status and Medical Interventions: CPR (Attempt to Resuscitate); Full Support   Ordered at: 04/10/25 2042     Code Status (Patient has no pulse and is not breathing):    CPR (Attempt to Resuscitate)     Medical Interventions (Patient has pulse or is breathing):    Full Support         No future appointments.    Additional Instructions for the Follow-ups that You Need to Schedule       Discharge Follow-up with PCP   As directed       Currently Documented PCP:    Provider, No Known    PCP Phone Number:    None     Follow Up Details: As needed        Discharge Follow-up with Specified Provider: Previous outpatient provider for psychiatry   As directed      To: Previous outpatient provider for psychiatry                Time spent on Discharge including face to face service: 40 minutes    Part of this note may be an electronic transcription/translation of spoken language to printed text using the Dragon dictation system.      Electronically signed by Wellington Jack MD, 04/14/25 10:57 EDT

## 2025-04-14 NOTE — PLAN OF CARE
Goal Outcome Evaluation:  Plan of Care Reviewed With: patient  Plan of Care Reviewed With: patient  Patient Agreement with Plan of Care: agrees      Pt. Reports sleeping well and feeling better. Pt. Denied any si/hi/avh and makes his needs known. Pt. Is wanting transportation to return to the [facility]

## 2025-04-14 NOTE — SIGNIFICANT NOTE
04/14/25 1531   Group Therapy Session   Group Attendance attended group session   Time Session Began 1400   Time Session Ended 1500   Total Time (minutes) 60   Group Type recreation   Group Topic Covered community integration;leisure exploration/use of leisure time;structured socialization   Group Session Detail Music Group   Patient Participation/Contribution able to recall/repeat info presented;cooperative with task;listened actively   Patient Participation Detail Suggested 1 song   Affect During Group appropriate to situation;calm   Degree of Insight low     Allowed group members to choose songs one at a time and played them for whole group to listen. RT turned off TV and lights to encourage a relaxed environment so pts are more able to engage with the songs without distraction. senior care through group RT offered group members group snack for participating and finished group listening to music while having snacks.     Pt sat quietly in recliner for part of group, other times was standing and pacing on empty side of dayroom as not to disturb peers. Only suggested one song, was more interested in listening than choosing which songs to play. Stayed for entire group even after RT notified group was over but would stay for last half of the hour to continue playing music if anyone was interested.     Overall pt was pleasant and maintained appropriate behaviors with peers and staff. Compliant with instruction from RT. No expression/indication of SI/HI or aggression at this time.

## 2025-04-14 NOTE — SIGNIFICANT NOTE
Disposition The patient reports at discharge his plan is to return to [place], [place] where he will reside with his mother. The patient reports she is agreeable to this.     Outpatient Services: Patient is agreeable to outpatient services after discharge. Patient reports that he is established with [facility] in [place], [place]. NN attempted to schedule Follow up appointment- No answer left message for return call.  Transportation: At this time the patient is attempting to call friends or family to secure transportation.   The patient may be in need of transportation assistance.   Pharmacy:  [facility] - [place], [place] - [address] - [phone] PH